# Patient Record
Sex: MALE | URBAN - METROPOLITAN AREA
[De-identification: names, ages, dates, MRNs, and addresses within clinical notes are randomized per-mention and may not be internally consistent; named-entity substitution may affect disease eponyms.]

---

## 2022-09-29 ENCOUNTER — APPOINTMENT (RX ONLY)
Dept: URBAN - METROPOLITAN AREA OTHER 8 | Facility: OTHER | Age: 27
Setting detail: DERMATOLOGY
End: 2022-09-29

## 2023-10-10 ENCOUNTER — DASHBOARD ENCOUNTERS (OUTPATIENT)
Age: 28
End: 2023-10-10

## 2023-10-10 ENCOUNTER — OFFICE VISIT (OUTPATIENT)
Dept: URBAN - METROPOLITAN AREA CLINIC 35 | Facility: CLINIC | Age: 28
End: 2023-10-10
Payer: COMMERCIAL

## 2023-10-10 VITALS
DIASTOLIC BLOOD PRESSURE: 72 MMHG | BODY MASS INDEX: 22.51 KG/M2 | HEIGHT: 69 IN | WEIGHT: 152 LBS | SYSTOLIC BLOOD PRESSURE: 118 MMHG

## 2023-10-10 DIAGNOSIS — R19.4 CHANGE IN BOWEL HABITS: ICD-10-CM

## 2023-10-10 DIAGNOSIS — K59.01 CONSTIPATION BY DELAYED COLONIC TRANSIT: ICD-10-CM

## 2023-10-10 DIAGNOSIS — R14.1 GAS PAIN: ICD-10-CM

## 2023-10-10 PROBLEM — 35298007: Status: ACTIVE | Noted: 2023-10-10

## 2023-10-10 PROCEDURE — 99203 OFFICE O/P NEW LOW 30 MIN: CPT | Performed by: PHYSICIAN ASSISTANT

## 2023-10-10 NOTE — HPI-CHANGE IN BOWEL HABITS
28 year old male patient presents today for a change in bowel habits. Admits to having up to 3-4 bowel movements per day. Stools are formed. Admits to having the urge to have a bowel movement in the mornings. Denies any strain. Admits to complete emptying. Admits he has a feeling of a gas sensation in his stomach.  He is hearing borborygmi.  He states he would typically have a BM in the mornings, twice a day, now 3-4 times a day. He has pain in his lower abdomen before having a BM.  He states his abd pain is relieved after a BM. He also has fecal urgency in the mornings.  He denies diarrhea. He states he will have a good BM initially then less stool production with the subsequent bowel movements.  He admits to increased stress with his job.  He identified #3 on the Bivalve Scale.

## 2023-10-10 NOTE — HPI-WEIGHT GAIN
Admits to weight gain and bloating since April. Admits he has gained about 6 pounds. Denies any recent imaging or testing.

## 2023-11-03 ENCOUNTER — TELEPHONE ENCOUNTER (OUTPATIENT)
Dept: URBAN - METROPOLITAN AREA CLINIC 31 | Facility: CLINIC | Age: 28
End: 2023-11-03

## 2023-11-07 ENCOUNTER — TELEPHONE ENCOUNTER (OUTPATIENT)
Dept: URBAN - METROPOLITAN AREA CLINIC 35 | Facility: CLINIC | Age: 28
End: 2023-11-07

## 2023-11-07 ENCOUNTER — OFFICE VISIT (OUTPATIENT)
Dept: URBAN - METROPOLITAN AREA CLINIC 35 | Facility: CLINIC | Age: 28
End: 2023-11-07

## 2023-11-07 NOTE — HPI-CHANGE IN BOWEL HABITS
28 Year old female patient presents today for change in bowel habits. Patient admits/denies improvement.  Patient currently admits -- bowel movements per --, with/without strain. Stools are --. Admits/Denies any blood, mucus, or melena in stools. He/She admits/denies any pruritus ani or rectal pain.